# Patient Record
Sex: MALE | Race: WHITE | Employment: OTHER | URBAN - METROPOLITAN AREA
[De-identification: names, ages, dates, MRNs, and addresses within clinical notes are randomized per-mention and may not be internally consistent; named-entity substitution may affect disease eponyms.]

---

## 2020-06-18 ENCOUNTER — VIRTUAL VISIT (OUTPATIENT)
Dept: FAMILY MEDICINE | Facility: CLINIC | Age: 35
End: 2020-06-18

## 2020-06-18 ENCOUNTER — TELEPHONE (OUTPATIENT)
Dept: INTERNAL MEDICINE | Facility: CLINIC | Age: 35
End: 2020-06-18

## 2020-06-18 DIAGNOSIS — F98.8 ATTENTION DEFICIT DISORDER, UNSPECIFIED HYPERACTIVITY PRESENCE: ICD-10-CM

## 2020-06-18 DIAGNOSIS — F41.8 DEPRESSION WITH ANXIETY: Primary | ICD-10-CM

## 2020-06-18 PROCEDURE — 99213 OFFICE O/P EST LOW 20 MIN: CPT | Mod: 95 | Performed by: INTERNAL MEDICINE

## 2020-06-18 RX ORDER — VENLAFAXINE 37.5 MG/1
TABLET ORAL
Qty: 60 TABLET | Refills: 1 | Status: SHIPPED | OUTPATIENT
Start: 2020-06-18

## 2020-06-18 RX ORDER — VENLAFAXINE 37.5 MG/1
TABLET ORAL
Qty: 60 TABLET | Refills: 1 | Status: SHIPPED | OUTPATIENT
Start: 2020-06-18 | End: 2020-06-18

## 2020-06-18 ASSESSMENT — PATIENT HEALTH QUESTIONNAIRE - PHQ9: SUM OF ALL RESPONSES TO PHQ QUESTIONS 1-9: 11

## 2020-06-18 NOTE — TELEPHONE ENCOUNTER
I have not seen this pt in 3 years.  Request for emotional support pet needs to come from psych provider.  Please contact pt and cancel appt.  Shwetha Kern CNP

## 2020-06-18 NOTE — PROGRESS NOTES
"Fer Smith is a 35 year old male who is being evaluated via a billable telephone visit.      The patient has been notified of following:     \"This telephone visit will be conducted via a call between you and your physician/provider. We have found that certain health care needs can be provided without the need for a physical exam.  This service lets us provide the care you need with a short phone conversation.  If a prescription is necessary we can send it directly to your pharmacy.  If lab work is needed we can place an order for that and you can then stop by our lab to have the test done at a later time.    Telephone visits are billed at different rates depending on your insurance coverage. During this emergency period, for some insurers they may be billed the same as an in-person visit.  Please reach out to your insurance provider with any questions.    If during the course of the call the physician/provider feels a telephone visit is not appropriate, you will not be charged for this service.\"    Patient has given verbal consent for Telephone visit?  Yes    What phone number would you like to be contacted at? [phone]    How would you like to obtain your AVS? Mail a copy        HPI    Patient has been experiencing increasing anxiety and depression (with anxiety more pronounced than the depression).  The anxiety is aggravated by not being able to focus on his work.  He has had a previous history of attention deficit disorder and is currently not on prescription medication for it.      Review of Systems   Constitutional: Negative for fatigue.   Eyes: Negative for visual disturbance.   Respiratory: Negative for shortness of breath.    Cardiovascular: Negative for chest pain and palpitations.   Gastrointestinal: Negative for abdominal pain, constipation, diarrhea, nausea and vomiting.   Musculoskeletal: Negative for myalgias.   Neurological: Negative for dizziness, light-headedness and headaches. "   Psychiatric/Behavioral: Positive for decreased concentration and dysphoric mood. Negative for confusion, hallucinations, sleep disturbance and suicidal ideas. The patient is nervous/anxious.          ICD-10-CM    1. Depression with anxiety  F41.8 venlafaxine (EFFEXOR) 37.5 MG tablet   2. Attention deficit disorder, unspecified hyperactivity presence  F98.8 venlafaxine (EFFEXOR) 37.5 MG tablet       Total time spent with the patient over the phone was 11 minutes.      Dr. Jl Boothe

## 2020-06-19 ENCOUNTER — TELEPHONE (OUTPATIENT)
Dept: FAMILY MEDICINE | Facility: CLINIC | Age: 35
End: 2020-06-19

## 2020-06-19 NOTE — TELEPHONE ENCOUNTER
Reason for Call:  Form, our goal is to have forms completed with 72 hours, however, some forms may require a visit or additional information.    Type of letter, form or note:  Emotional support (Animal)    Who is the form from?: [employer] (Apartment complex) (if other please explain)    Where did the form come from: form was faxed in    What clinic location was the form placed at?: [facility]    Where the form was placed: Form was faxed in    What number is listed as a contact on the form?: [phone]       Additional comments: Patient is requesting the form states that he will need an emotional support animal approval for his apartment complex. Patient is asking that it be done today because the  is only in on Friday's.    Call taken on 6/19/2020 at 10:35 AM by Brittney Steiner

## 2020-06-22 ASSESSMENT — ENCOUNTER SYMPTOMS
DIARRHEA: 0
DECREASED CONCENTRATION: 1
MYALGIAS: 0
PALPITATIONS: 0
VOMITING: 0
CONFUSION: 0
LIGHT-HEADEDNESS: 0
DIZZINESS: 0
NERVOUS/ANXIOUS: 1
ABDOMINAL PAIN: 0
HEADACHES: 0
HALLUCINATIONS: 0
SHORTNESS OF BREATH: 0
NAUSEA: 0
FATIGUE: 0
SLEEP DISTURBANCE: 0
CONSTIPATION: 0
DYSPHORIC MOOD: 1

## 2020-06-22 NOTE — TELEPHONE ENCOUNTER
Spoke to patient, will fax form again just in case, also made a Telephone visit for him in regards to form requested for this upcoming Wednesday 24th at 2:30 pm.    Lindsay Mcpherson MA on 6/22/2020 at 3:44 PM

## 2020-06-22 NOTE — TELEPHONE ENCOUNTER
Form was not there when I was at clinic last week Friday.    Please inform patient that I will fill out the form this Wednesday when I'm at the clinic.

## 2020-06-24 ENCOUNTER — VIRTUAL VISIT (OUTPATIENT)
Dept: FAMILY MEDICINE | Facility: CLINIC | Age: 35
End: 2020-06-24

## 2020-06-24 DIAGNOSIS — Z53.9 ERRONEOUS ENCOUNTER--DISREGARD: Primary | ICD-10-CM

## 2020-06-24 NOTE — PROGRESS NOTES
Patient was scheduled for telephone visit today to complete forms requesting for an emotional support animal.  A section of the forms asked whether I (the provider) would be willing to testify in court in relation to this matter.  I informed the patient that since there is a medico-legal component to the forms, I will need to forward it to our clinic supervisor and legal department.  Visit was then canceled and patient was reassured that he would not be charged.

## 2020-08-19 ENCOUNTER — MYC MEDICAL ADVICE (OUTPATIENT)
Dept: FAMILY MEDICINE | Facility: CLINIC | Age: 35
End: 2020-08-19

## 2020-08-19 NOTE — TELEPHONE ENCOUNTER
Dr Boothe     See below - noted per VV notes 6/24 you had cancelled visit for support animal forms for patient     Would you recommend he go through mental health for this? Would need a mental health referral placed     Please advise     Sandie GAN RN

## 2020-11-29 ENCOUNTER — HEALTH MAINTENANCE LETTER (OUTPATIENT)
Age: 35
End: 2020-11-29

## 2021-09-25 ENCOUNTER — HEALTH MAINTENANCE LETTER (OUTPATIENT)
Age: 36
End: 2021-09-25

## 2022-01-15 ENCOUNTER — HEALTH MAINTENANCE LETTER (OUTPATIENT)
Age: 37
End: 2022-01-15

## 2022-12-26 ENCOUNTER — HEALTH MAINTENANCE LETTER (OUTPATIENT)
Age: 37
End: 2022-12-26

## 2023-04-16 ENCOUNTER — HEALTH MAINTENANCE LETTER (OUTPATIENT)
Age: 38
End: 2023-04-16

## 2024-06-23 ENCOUNTER — HEALTH MAINTENANCE LETTER (OUTPATIENT)
Age: 39
End: 2024-06-23